# Patient Record
Sex: MALE | Race: WHITE | NOT HISPANIC OR LATINO | Employment: FULL TIME | URBAN - METROPOLITAN AREA
[De-identification: names, ages, dates, MRNs, and addresses within clinical notes are randomized per-mention and may not be internally consistent; named-entity substitution may affect disease eponyms.]

---

## 2021-04-15 ENCOUNTER — HOSPITAL ENCOUNTER (OUTPATIENT)
Dept: URGENT CARE | Facility: CLINIC | Age: 36
Discharge: HOME | End: 2021-04-15
Attending: NURSE PRACTITIONER

## 2021-05-16 ENCOUNTER — HOSPITAL ENCOUNTER (OUTPATIENT)
Dept: URGENT CARE | Facility: CLINIC | Age: 36
Discharge: HOME | End: 2021-05-16
Attending: FAMILY MEDICINE

## 2022-06-14 PROCEDURE — 87081 CULTURE SCREEN ONLY: CPT

## 2023-05-31 ENCOUNTER — TRANSCRIBE ORDERS (OUTPATIENT)
Dept: GENERAL RADIOLOGY | Facility: HOSPITAL | Age: 38
End: 2023-05-31

## 2023-09-15 ENCOUNTER — HOSPITAL ENCOUNTER (EMERGENCY)
Facility: HOSPITAL | Age: 38
Discharge: HOME | End: 2023-09-16
Attending: EMERGENCY MEDICINE
Payer: COMMERCIAL

## 2023-09-15 ENCOUNTER — APPOINTMENT (OUTPATIENT)
Dept: GENERAL RADIOLOGY | Facility: HOSPITAL | Age: 38
End: 2023-09-15
Payer: COMMERCIAL

## 2023-09-15 VITALS
HEIGHT: 72 IN | OXYGEN SATURATION: 98 % | RESPIRATION RATE: 18 BRPM | DIASTOLIC BLOOD PRESSURE: 96 MMHG | TEMPERATURE: 98 F | BODY MASS INDEX: 25.59 KG/M2 | HEART RATE: 82 BPM | SYSTOLIC BLOOD PRESSURE: 164 MMHG | WEIGHT: 188.93 LBS

## 2023-09-15 DIAGNOSIS — S62.336A CLOSED DISPLACED FRACTURE OF NECK OF FIFTH METACARPAL BONE OF RIGHT HAND, INITIAL ENCOUNTER: Primary | ICD-10-CM

## 2023-09-15 PROCEDURE — 99283 EMERGENCY DEPT VISIT LOW MDM: CPT

## 2023-09-15 PROCEDURE — 96372 THER/PROPH/DIAG INJ SC/IM: CPT

## 2023-09-15 PROCEDURE — 73130 X-RAY EXAM OF HAND: CPT

## 2023-09-16 PROCEDURE — 25010000002 KETOROLAC TROMETHAMINE PER 15 MG: Performed by: NURSE PRACTITIONER

## 2023-09-16 PROCEDURE — 96372 THER/PROPH/DIAG INJ SC/IM: CPT

## 2023-09-16 RX ORDER — KETOROLAC TROMETHAMINE 10 MG/1
10 TABLET, FILM COATED ORAL EVERY 6 HOURS PRN
Qty: 15 TABLET | Refills: 0 | Status: SHIPPED | OUTPATIENT
Start: 2023-09-16

## 2023-09-16 RX ORDER — KETOROLAC TROMETHAMINE 30 MG/ML
30 INJECTION, SOLUTION INTRAMUSCULAR; INTRAVENOUS ONCE
Status: COMPLETED | OUTPATIENT
Start: 2023-09-16 | End: 2023-09-16

## 2023-09-16 RX ORDER — OXYCODONE HYDROCHLORIDE AND ACETAMINOPHEN 5; 325 MG/1; MG/1
1 TABLET ORAL EVERY 6 HOURS PRN
Qty: 12 TABLET | Refills: 0 | Status: SHIPPED | OUTPATIENT
Start: 2023-09-16

## 2023-09-16 RX ADMIN — KETOROLAC TROMETHAMINE 30 MG: 30 INJECTION, SOLUTION INTRAMUSCULAR; INTRAVENOUS at 00:21

## 2023-09-16 NOTE — ED PROVIDER NOTES
Time: 9:39 PM EDT  Date of encounter:  9/15/2023  Independent Historian/Clinical History and Information was obtained by:   Patient    History is limited by: N/A    Chief Complaint   Patient presents with    Hand Injury         History of Present Illness:  Patient is a 38 y.o. year old male who presents to the emergency department for evaluation of right hand pain.  Patient states he got frustrated this afternoon and punched a wall and now is having right hand pain along the fifth metacarpal.  (Provider in triage, Rodger Duncan PA-C)      History provided by:  Patient    Patient Care Team  Primary Care Provider: Erica Mattson APRN    Past Medical History:     No Known Allergies  Past Medical History:   Diagnosis Date    GERD (gastroesophageal reflux disease)      No past surgical history on file.  Family History   Problem Relation Age of Onset    Esophageal cancer Father        Home Medications:  Prior to Admission medications    Medication Sig Start Date End Date Taking? Authorizing Provider   cyclobenzaprine (FLEXERIL) 5 MG tablet Take 1 tablet by mouth 3 (Three) Times a Day As Needed for Muscle Spasms. 9/14/22   Lisy Griffin APRN   diclofenac (VOLTAREN) 50 MG EC tablet Take 1 tablet by mouth 2 (Two) Times a Day As Needed (back pain). 9/14/22   Lisy Griffin APRN   pantoprazole (PROTONIX) 40 MG EC tablet pantoprazole 40 mg tablet,delayed release   TAKE 1 TABLET BY MOUTH EVERY DAY    Provider, MD Khushboo        Social History:   Social History     Tobacco Use    Smoking status: Never    Smokeless tobacco: Former     Types: Chew   Vaping Use    Vaping Use: Every day   Substance Use Topics    Alcohol use: Not Currently    Drug use: Not Currently         Review of Systems:  Review of Systems   Constitutional:  Negative for chills and fever.   HENT:  Negative for congestion, ear pain and sore throat.    Eyes:  Negative for pain.   Respiratory:  Negative for cough, chest tightness and shortness  "of breath.    Cardiovascular:  Negative for chest pain.   Gastrointestinal:  Negative for abdominal pain, diarrhea, nausea and vomiting.   Genitourinary:  Negative for flank pain and hematuria.   Musculoskeletal:  Positive for arthralgias. Negative for joint swelling.   Skin:  Negative for pallor and wound.   Neurological:  Negative for seizures and headaches.   All other systems reviewed and are negative.     Physical Exam:  /96   Pulse 82   Temp 98 °F (36.7 °C) (Oral)   Resp 18   Ht 182.9 cm (72\")   Wt 85.7 kg (188 lb 15 oz)   SpO2 98%   BMI 25.62 kg/m²         Physical Exam  Constitutional:       Appearance: Normal appearance.   HENT:      Head: Normocephalic.   Eyes:      Extraocular Movements: Extraocular movements intact.      Conjunctiva/sclera: Conjunctivae normal.   Pulmonary:      Effort: Pulmonary effort is normal.   Abdominal:      General: There is no distension.   Musculoskeletal:      Right hand: Tenderness and bony tenderness present. Decreased range of motion. There is no disruption of two-point discrimination. Normal capillary refill. Normal pulse.        Hands:    Skin:     General: Skin is warm.      Coloration: Skin is not cyanotic.   Neurological:      Mental Status: He is alert and oriented to person, place, and time.   Psychiatric:         Attention and Perception: Attention and perception normal.         Mood and Affect: Mood normal.                    Procedures:  Procedures      Medical Decision Making:      Comorbidities that affect care:    None    External Notes reviewed:    None      The following orders were placed and all results were independently analyzed by me:  Orders Placed This Encounter   Procedures    XR Hand 3+ View Right    Ambulatory Referral to Orthopedic Surgery    Obtain & Apply The Following-       Medications Given in the Emergency Department:  Medications   ketorolac (TORADOL) injection 30 mg (30 mg Intramuscular Given 9/16/23 0021)        ED " Course:    The patient was initially evaluated in the triage area where orders were placed. The patient was later dispositioned by SUKHDEEP Freeman.      The patient was advised to stay for completion of workup which includes but is not limited to communication of labs and radiological results, reassessment and plan. The patient was advised that leaving prior to disposition by a provider could result in critical findings that are not communicated to the patient.     ED Course as of 09/16/23 0618   Fri Sep 15, 2023   2141 PROVIDER IN TRIAGE  Patient was evaluated by me in triage, Rodger Duncan PA-C.  Orders were placed and patient is currently awaiting final results and disposition.  [MD]      ED Course User Index  [MD] Rodger Duncan PA-C       Labs:    Lab Results (last 24 hours)       ** No results found for the last 24 hours. **             Imaging:    XR Hand 3+ View Right    Result Date: 9/15/2023  PROCEDURE: XR HAND 3+ VW RIGHT  COMPARISON: None  INDICATIONS: RIGHT HAND PAIN IN 5TH METACARPAL AFTER PUNCHING WALL  FINDINGS:  There is a comminuted fracture involving the distal 5th metacarpal bone.  There is ventral angulation of the distal head fracture fragment.  No dislocation is seen.  Surrounding soft tissue swelling is noted.        1. Comminuted fracture of the distal 5th metacarpal bone.  There is ventral angulation of the distal head fracture fragment.  There is no dislocation.      PANCHO ANTONIO MD       Electronically Signed and Approved By: PANCHO ANTONIO MD on 9/15/2023 at 22:26                Differential Diagnosis and Discussion:      Extremity Pain: Differential diagnosis includes but is not limited to soft tissue sprain, tendonitis, tendon injury, dislocation, fracture, deep vein thrombosis, arterial insufficiency, osteoarthritis, bursitis, and ligamentous damage.    All X-rays impressions were independently interpreted by me.    MDM     Amount and/or Complexity of Data Reviewed  Tests  in the radiology section of CPT®: reviewed             Patient Care Considerations:          Consultants/Shared Management Plan:    None    Social Determinants of Health:    Patient is independent, reliable, and has access to care.       Disposition and Care Coordination:    Discharged: The patient is suitable and stable for discharge with no need for consideration of observation or admission.    I have explained the patient´s condition, diagnoses and treatment plan based on the information available to me at this time. I have answered questions and addressed any concerns. The patient has a good  understanding of the patient´s diagnosis, condition, and treatment plan as can be expected at this point. The vital signs have been stable. The patient´s condition is stable and appropriate for discharge from the emergency department.      The patient will pursue further outpatient evaluation with the primary care physician or other designated or consulting physician as outlined in the discharge instructions. They are agreeable to this plan of care and follow-up instructions have been explained in detail. The patient has received these instructions in written format and have expressed an understanding of the discharge instructions. The patient is aware that any significant change in condition or worsening of symptoms should prompt an immediate return to this or the closest emergency department or call to 911.    Final diagnoses:   Closed displaced fracture of neck of fifth metacarpal bone of right hand, initial encounter        ED Disposition       ED Disposition   Discharge    Condition   Stable    Comment   --               This medical record created using voice recognition software.             Mickey Trevino, APRN  09/16/23 0618

## 2023-09-18 ENCOUNTER — TELEPHONE (OUTPATIENT)
Dept: ORTHOPEDIC SURGERY | Facility: CLINIC | Age: 38
End: 2023-09-18
Payer: COMMERCIAL

## 2023-09-18 NOTE — TELEPHONE ENCOUNTER
Comminuted fracture of the distal 5th metacarpal bone.  There is ventral angulation of the   distal head fracture fragment.  There is no dislocation. Right hand xray at [facility] 9-15

## 2023-09-19 ENCOUNTER — OFFICE VISIT (OUTPATIENT)
Dept: ORTHOPEDIC SURGERY | Facility: CLINIC | Age: 38
End: 2023-09-19
Payer: COMMERCIAL

## 2023-09-19 VITALS — WEIGHT: 188 LBS | HEIGHT: 72 IN | BODY MASS INDEX: 25.47 KG/M2

## 2023-09-19 DIAGNOSIS — S62.306A CLOSED FRACTURE OF FIFTH METACARPAL BONE OF RIGHT HAND, UNSPECIFIED FRACTURE MORPHOLOGY, INITIAL ENCOUNTER: Primary | ICD-10-CM

## 2023-09-19 RX ORDER — TRAMADOL HYDROCHLORIDE 50 MG/1
50 TABLET ORAL EVERY 6 HOURS PRN
Qty: 30 TABLET | Refills: 0 | Status: SHIPPED | OUTPATIENT
Start: 2023-09-19

## 2023-09-19 NOTE — PROGRESS NOTES
"Chief Complaint  Initial Evaluation of the Right Hand     Subjective      Celio Fairchild presents to [facility] for initial evaluation of the right hand.  Friday night he punched a wall and hit a stud.  He went to the ER and had X rays and placed on a splint and a sling.  He is here today for treatment intervention.     No Known Allergies     Social History     Socioeconomic History    Marital status: Single   Tobacco Use    Smoking status: Never    Smokeless tobacco: Former     Types: Chew   Vaping Use    Vaping Use: Every day   Substance and Sexual Activity    Alcohol use: Not Currently    Drug use: Not Currently    Sexual activity: Never        I reviewed the patient's chief complaint, history of present illness, review of systems, past medical history, surgical history, family history, social history, medications, and allergy list.     Review of Systems     Constitutional: Denies fevers, chills, weight loss  Cardiovascular: Denies chest pain, shortness of breath  Skin: Denies rashes, acute skin changes  Neurologic: Denies headache, loss of consciousness        Vital Signs:   Ht 182.9 cm (72\")   Wt 85.3 kg (188 lb)   BMI 25.50 kg/m²          Physical Exam  General: Alert. No acute distress    Ortho Exam        RIGHT HAND Mildly Full ROM of the hand, fingers, and elbow. Sensation grossly intact to light touch, median, radial and ulnar nerve. Positive AIN, PIN and ulnar nerve motor function intact. Axillary nerve intact. Positive pulses.       Orthopedic Injury Treatment    Date/Time: 9/19/2023 10:35 AM  Performed by: Maximilian Garcia MD  Authorized by: Maximilian Garcia MD   Injury location: hand  Location details: right hand  Injury type: fracture    Anesthesia:  Local anesthesia used: no    Sedation:  Patient sedated: no    Immobilization: splint  Splint type: ulnar gutter  Post-procedure neurovascular assessment: post-procedure neurovascularly intact  Patient tolerance: patient " tolerated the procedure well with no immediate complications        Imaging Results (Most Recent)       None             Result Review :         XR Hand 3+ View Right    Result Date: 9/15/2023  Narrative: PROCEDURE: XR HAND 3+ VW RIGHT  COMPARISON: None  INDICATIONS: RIGHT HAND PAIN IN 5TH METACARPAL AFTER PUNCHING WALL  FINDINGS:  There is a comminuted fracture involving the distal 5th metacarpal bone.  There is ventral angulation of the distal head fracture fragment.  No dislocation is seen.  Surrounding soft tissue swelling is noted.      Impression:   1. Comminuted fracture of the distal 5th metacarpal bone.  There is ventral angulation of the distal head fracture fragment.  There is no dislocation.      PANCHO ANTONIO MD       Electronically Signed and Approved By: PANCHO ANTONIO MD on 9/15/2023 at 22:26                     Assessment and Plan     Diagnoses and all orders for this visit:    1. Communited fracture of the 5 th metacarpal of the right hand (Primary)        Discussed the treatment plan with the patient. I reviewed the X-rays that were obtained 9/15/23 with the patient.     Discussed the treatment options with the patient, operative vs non-operative.     Refer to Dr Samaniego's group.     New splint donned.      Call or return if worsening symptoms.    Follow Up     PRN      Patient was given instructions and counseling regarding his condition or for health maintenance advice. Please see specific information pulled into the AVS if appropriate.     Scribed for Maximilian Garcia MD by Doris Perez MA.  09/19/23   09:35 EDT    I have personally performed the services described in this document as scribed by the above individual and it is both accurate and complete. Maximilian Garcia MD 09/20/23

## 2023-10-18 ENCOUNTER — TELEPHONE (OUTPATIENT)
Dept: GASTROENTEROLOGY | Facility: CLINIC | Age: 38
End: 2023-10-18
Payer: COMMERCIAL

## 2023-10-18 NOTE — TELEPHONE ENCOUNTER
PATIENT CONFIRMED HIS APPOINTMENT ON 10/20/23 AT [facility] ON 10/20/23. HE RESCHEDULE HIS APPOINTMENT FROM 2:00 TO 9:45.

## 2023-10-20 ENCOUNTER — TELEPHONE (OUTPATIENT)
Dept: GASTROENTEROLOGY | Facility: HOSPITAL | Age: 38
End: 2023-10-20
Payer: COMMERCIAL

## 2023-10-20 ENCOUNTER — OFFICE VISIT (OUTPATIENT)
Dept: GASTROENTEROLOGY | Facility: HOSPITAL | Age: 38
End: 2023-10-20
Payer: COMMERCIAL

## 2023-10-20 VITALS
DIASTOLIC BLOOD PRESSURE: 69 MMHG | HEART RATE: 80 BPM | BODY MASS INDEX: 25.53 KG/M2 | SYSTOLIC BLOOD PRESSURE: 124 MMHG | WEIGHT: 188.5 LBS | HEIGHT: 72 IN

## 2023-10-20 DIAGNOSIS — B18.2 CHRONIC HEPATITIS C WITHOUT HEPATIC COMA: Primary | ICD-10-CM

## 2023-10-20 PROBLEM — B19.20 VIRAL HEPATITIS C: Status: ACTIVE | Noted: 2023-05-31

## 2023-10-20 PROBLEM — K21.9 GASTROESOPHAGEAL REFLUX DISEASE WITHOUT ESOPHAGITIS: Status: ACTIVE | Noted: 2023-05-31

## 2023-10-20 LAB
ALBUMIN SERPL-MCNC: 5.2 G/DL (ref 3.5–5.2)
ALBUMIN/GLOB SERPL: 1.3 G/DL
ALP SERPL-CCNC: 83 U/L (ref 39–117)
ALT SERPL W P-5'-P-CCNC: 75 U/L (ref 1–41)
ANION GAP SERPL CALCULATED.3IONS-SCNC: 13 MMOL/L (ref 5–15)
AST SERPL-CCNC: 48 U/L (ref 1–40)
BASOPHILS # BLD AUTO: 0.03 10*3/MM3 (ref 0–0.2)
BASOPHILS NFR BLD AUTO: 0.4 % (ref 0–1.5)
BILIRUB SERPL-MCNC: 1 MG/DL (ref 0–1.2)
BUN SERPL-MCNC: 14 MG/DL (ref 6–20)
BUN/CREAT SERPL: 11.6 (ref 7–25)
CALCIUM SPEC-SCNC: 10.1 MG/DL (ref 8.6–10.5)
CHLORIDE SERPL-SCNC: 101 MMOL/L (ref 98–107)
CO2 SERPL-SCNC: 27 MMOL/L (ref 22–29)
CREAT SERPL-MCNC: 1.21 MG/DL (ref 0.76–1.27)
DEPRECATED RDW RBC AUTO: 40.3 FL (ref 37–54)
EGFRCR SERPLBLD CKD-EPI 2021: 79.6 ML/MIN/1.73
EOSINOPHIL # BLD AUTO: 0.06 10*3/MM3 (ref 0–0.4)
EOSINOPHIL NFR BLD AUTO: 0.9 % (ref 0.3–6.2)
ERYTHROCYTE [DISTWIDTH] IN BLOOD BY AUTOMATED COUNT: 12.4 % (ref 12.3–15.4)
GLOBULIN UR ELPH-MCNC: 4.1 GM/DL
GLUCOSE SERPL-MCNC: 95 MG/DL (ref 65–99)
HAV IGM SERPL QL IA: ABNORMAL
HBV CORE IGM SERPL QL IA: ABNORMAL
HBV SURFACE AB SER RIA-ACNC: REACTIVE
HBV SURFACE AG SERPL QL IA: ABNORMAL
HCT VFR BLD AUTO: 47.8 % (ref 37.5–51)
HCV AB SER DONR QL: REACTIVE
HGB BLD-MCNC: 16.9 G/DL (ref 13–17.7)
HIV1+2 AB SER QL: NORMAL
IMM GRANULOCYTES # BLD AUTO: 0.02 10*3/MM3 (ref 0–0.05)
IMM GRANULOCYTES NFR BLD AUTO: 0.3 % (ref 0–0.5)
LYMPHOCYTES # BLD AUTO: 1.74 10*3/MM3 (ref 0.7–3.1)
LYMPHOCYTES NFR BLD AUTO: 25.6 % (ref 19.6–45.3)
MCH RBC QN AUTO: 32 PG (ref 26.6–33)
MCHC RBC AUTO-ENTMCNC: 35.4 G/DL (ref 31.5–35.7)
MCV RBC AUTO: 90.5 FL (ref 79–97)
MONOCYTES # BLD AUTO: 0.83 10*3/MM3 (ref 0.1–0.9)
MONOCYTES NFR BLD AUTO: 12.2 % (ref 5–12)
NEUTROPHILS NFR BLD AUTO: 4.11 10*3/MM3 (ref 1.7–7)
NEUTROPHILS NFR BLD AUTO: 60.6 % (ref 42.7–76)
NRBC BLD AUTO-RTO: 0 /100 WBC (ref 0–0.2)
PLATELET # BLD AUTO: 350 10*3/MM3 (ref 140–450)
PMV BLD AUTO: 11.5 FL (ref 6–12)
POTASSIUM SERPL-SCNC: 4.4 MMOL/L (ref 3.5–5.2)
PROT SERPL-MCNC: 9.3 G/DL (ref 6–8.5)
RBC # BLD AUTO: 5.28 10*6/MM3 (ref 4.14–5.8)
SODIUM SERPL-SCNC: 141 MMOL/L (ref 136–145)
WBC NRBC COR # BLD: 6.79 10*3/MM3 (ref 3.4–10.8)

## 2023-10-20 PROCEDURE — 87522 HEPATITIS C REVRS TRNSCRPJ: CPT | Performed by: NURSE PRACTITIONER

## 2023-10-20 PROCEDURE — 87902 NFCT AGT GNTYP ALYS HEP C: CPT | Performed by: NURSE PRACTITIONER

## 2023-10-20 PROCEDURE — 80074 ACUTE HEPATITIS PANEL: CPT | Performed by: NURSE PRACTITIONER

## 2023-10-20 PROCEDURE — G0432 EIA HIV-1/HIV-2 SCREEN: HCPCS | Performed by: NURSE PRACTITIONER

## 2023-10-20 PROCEDURE — G0463 HOSPITAL OUTPT CLINIC VISIT: HCPCS | Performed by: NURSE PRACTITIONER

## 2023-10-20 PROCEDURE — 86706 HEP B SURFACE ANTIBODY: CPT | Performed by: NURSE PRACTITIONER

## 2023-10-20 PROCEDURE — 80053 COMPREHEN METABOLIC PANEL: CPT | Performed by: NURSE PRACTITIONER

## 2023-10-20 PROCEDURE — 91200 LIVER ELASTOGRAPHY: CPT | Performed by: NURSE PRACTITIONER

## 2023-10-20 PROCEDURE — 85025 COMPLETE CBC W/AUTO DIFF WBC: CPT | Performed by: NURSE PRACTITIONER

## 2023-10-20 PROCEDURE — 86704 HEP B CORE ANTIBODY TOTAL: CPT | Performed by: NURSE PRACTITIONER

## 2023-10-20 NOTE — PROGRESS NOTES
Chief Complaint      Chief Complaint  Hepatitis C    Subjective            History of Present Illness     Celio Fairchild presents to [facility] for evaluation and treatment of chronic HCV.      He reports being diagnosed with hepatitis C in 2019.  Denies previous treatment.  Admits a history of illicit drug use.  Reports being clean for the past 6 years.  Reports drinking 2 beers each night.      Reports recent RUQ US at [facility].       Past Medical History     No Known Allergies    Current Outpatient Medications:     pantoprazole (PROTONIX) 40 MG EC tablet, pantoprazole 40 mg tablet,delayed release  TAKE 1 TABLET BY MOUTH EVERY DAY, Disp: , Rfl:   Past Medical History:   Diagnosis Date    GERD (gastroesophageal reflux disease)      History reviewed. No pertinent surgical history.  Social History     Socioeconomic History    Marital status: Single   Tobacco Use    Smoking status: Never    Smokeless tobacco: Former     Types: Chew   Vaping Use    Vaping Use: Every day   Substance and Sexual Activity    Alcohol use: Yes    Drug use: Not Currently    Sexual activity: Never       Objective     Objective     Vitals:    10/20/23 0949   BP: 124/69   Pulse: 80     Body mass index is 25.56 kg/m².  Body surface area is 2.08 meters squared.    Physical Exam    Results       Result Review :     The following data was reviewed by: SUKHDEEP Knox on 10/20/2023:    Donna Stiffness Consistent with: F0-F1   CAP Score: S0    CMP:  Lab Results   Component Value Date     (H) 01/31/2019    CREATININE 6.74 (H) 01/31/2019     01/31/2019    K 3.8 01/31/2019    CL 69 (L) 01/31/2019    CALCIUM 8.4 (L) 01/31/2019    ALBUMIN 4.7 01/31/2019    BILITOT 1.36 (H) 01/31/2019    ALKPHOS 89 01/31/2019    AST 76 (H) 01/31/2019     (H) 01/31/2019     CBC w/ diff:   Lab Results   Component Value Date    WBC 28.40 (H) 01/31/2019    RBC 5.81 01/31/2019    HGB 18.20 (H) 01/31/2019     HCT 51.7 01/31/2019    MCV 88.9 01/31/2019    MCH 31.4 (H) 01/31/2019    MCHC 35.3 01/31/2019    RDW 12.0 01/31/2019    .00 (H) 01/31/2019    NEUTRORELPCT 80.0 01/31/2019    MONORELPCT 14.30 (H) 01/31/2019    EOSRELPCT 0.04 01/31/2019    BASORELPCT 0.06 01/31/2019       Acute Hepatitis Panel   Lab Results   Component Value Date    HEPAIGM Negative 01/31/2019    HEPBIGMCORE Negative 01/31/2019 1/31/2019 hepatitis C antibody-reactive.    6/6/2023 right upper quadrant ultrasound-liver measures 17 cm.  Normal gallbladder.  No liver masses are seen.      Liver Elastography    Performed by: Cookie Peterson APRN  Authorized by: Cookie Peterson APRN  Ordering Provider: Cookie Peterson APRN    Probe:  M+  Procedure Details:  Procedure: After providing an oral and written explanation of the Fibroscan vibration controlled transient elastography (VTCE) test procedure to the patient. The patient was placed in supine position with right arm in maximum abduction to allow optimal exposure of right lateral abdomen. Patient was briefly assessed, identifying terminus of the xyphoid process and locating an ideal transient elastography testing site, midline and lateral to this point. Patient was instructed to breathe normally and remain stationary during the test process. Pre-measurement data confirmed the transient elastography probe was centered over the liver parenchyma. A series of ten 50 Hz mechanical pulses were applied with controlled application pressure to induce a mechanical shear wave in the liver tissue. For each measurement, the shear wave propagation speed was detected, displayed and converted to its equivalent liver stiffness value in kilopascals. Skin to liver capsules distance and shear wave characteristics were monitored during the entire examination to assure quality data. Median liver stiffness measurement and interquartile range were calculated and displayed in real time. Acquired  measurement data was stored and submitted to the provider for review and interpretation. Patient tolerated the procedure well and was discharged without incident.   Clinical Information:     NPO 3 Hours or More: Yes      Actively Drinking: No    Findings:     Median Liver Stiffness Score:  2.6    Interquartile Range (IQR) to Median Ratio:  11    Interpretation:  Taking into account the patient's history and recent liver test, this Liver Stiffness Score is consistent with below scale:    Donna Stiffness Consistent with:  F0-F1    Current Scan Considered Reliab: Yes      Median Controlled Attenuation Parameter (dB/m):  216    IQR:  14    Liver Fat Interpretation:  Taking into account the patient's history, this CAP score is consistent with below scale:      CAP SCORE:  Normal/mild liver fat       Assessment and Plan            Assessment:    Diagnoses and all orders for this visit:    1. Chronic hepatitis C without hepatic coma (Primary)  -     Liver Elastography  -     Hepatitis B Surface Antibody  -     Hepatitis B Core Antibody, Total  -     HIV-1 & HIV-2 Antibodies  -     Hepatitis C Genotype  -     Hepatitis C RNA, Quantitative, PCR (graph)  -     Comprehensive Metabolic Panel  -     CBC Auto Differential  -     Cancel: US Abdomen Limited; Future  -     Hepatitis Panel, Acute  -     HCV FibroSURE         Plan:   Labs today to determine genotype and treatment plan.      Patient Instructions: Avoid Alcohol, Avoid Illicit Drug Use, Importance of keeping appointments.  Patient Education Provided: Yes    Follow Up     Follow Up   Return in about 4 weeks (around 11/17/2023) for Hepatitis C.  Patient was given instructions and counseling regarding his condition or for health maintenance advice. Please see specific information pulled into the AVS if appropriate.     Cookie Peterson, APRN  10/20/2023

## 2023-10-20 NOTE — TELEPHONE ENCOUNTER
Requested labs from Erica Mattson office.   Labs being faxed from 5/23.   Ultrasound requested from pcp.

## 2023-10-21 LAB — HBV CORE AB SERPL QL IA: NEGATIVE

## 2023-10-22 LAB
HCV GENTYP SERPL NAA+PROBE: NORMAL
LABORATORY COMMENT REPORT: NORMAL

## 2023-10-23 LAB
HCV RNA SERPL NAA+PROBE-ACNC: NORMAL IU/ML
HCV RNA SERPL NAA+PROBE-LOG IU: 4.8 LOG10 IU/ML
TEST INFORMATION: NORMAL

## 2023-10-24 ENCOUNTER — TELEPHONE (OUTPATIENT)
Dept: GASTROENTEROLOGY | Facility: CLINIC | Age: 38
End: 2023-10-24
Payer: COMMERCIAL

## 2023-10-24 LAB
A2 MACROGLOB SERPL-MCNC: 242 MG/DL (ref 110–276)
ALT SERPL W P-5'-P-CCNC: 79 IU/L (ref 0–55)
APO A-I SERPL-MCNC: 186 MG/DL (ref 101–178)
BILIRUB SERPL-MCNC: 0.9 MG/DL (ref 0–1.2)
FIBROSIS SCORING:: ABNORMAL
FIBROSIS STAGE SERPL QL: ABNORMAL
GGT SERPL-CCNC: 79 IU/L (ref 0–65)
HAPTOGLOB SERPL-MCNC: 95 MG/DL (ref 17–317)
HCV AB SER QL: ABNORMAL
LABORATORY COMMENT REPORT: ABNORMAL
LIVER FIBR SCORE SERPL CALC.FIBROSURE: 0.34 (ref 0–0.21)
NECROINFLAMM ACTIVITY SCORING:: ABNORMAL
NECROINFLAMMATORY ACT GRADE SERPL QL: ABNORMAL
NECROINFLAMMATORY ACT SCORE SERPL: 0.48 (ref 0–0.17)
SERVICE CMNT-IMP: ABNORMAL
TEST PERFORMANCE INFO SPEC: ABNORMAL

## 2023-10-24 NOTE — TELEPHONE ENCOUNTER
----- Message from SUKHDEEP Knox sent at 10/23/2023  1:40 PM EDT -----  Labs c/w HCV, GT 1a, please obtain approval for epclusa and schedule for training visit.

## 2023-11-02 ENCOUNTER — TELEPHONE (OUTPATIENT)
Dept: GASTROENTEROLOGY | Facility: CLINIC | Age: 38
End: 2023-11-02
Payer: COMMERCIAL

## 2023-11-02 NOTE — TELEPHONE ENCOUNTER
Patient said his insurance has switched from [employer] to [employer] starting yesterday. I have updated his insurance in his chart. He has a nurse visit with you on 11/17/23.

## 2023-11-15 ENCOUNTER — TELEPHONE (OUTPATIENT)
Dept: GASTROENTEROLOGY | Facility: HOSPITAL | Age: 38
End: 2023-11-15
Payer: COMMERCIAL

## 2023-11-15 NOTE — TELEPHONE ENCOUNTER
Attempted to contact the patient to confirm the appointment on 11/17/23 at the [facility]. Patient did not answer and the voicemail box was full so I was unable to leave a voicemail requesting a call back.

## 2023-11-17 ENCOUNTER — CLINICAL SUPPORT (OUTPATIENT)
Dept: GASTROENTEROLOGY | Facility: HOSPITAL | Age: 38
End: 2023-11-17
Payer: COMMERCIAL

## 2023-11-17 DIAGNOSIS — B18.2 CHRONIC HEPATITIS C WITH HEPATIC COMA: Primary | ICD-10-CM

## 2023-11-17 NOTE — PROGRESS NOTES
Celio Fairchild 1985 completed a medication education visit on 11/17/2023 for Epclusa prescribed by SUKHDEEP Golden. Celio Fairchild verbalized understanding for all of the information outlined in the signed agreement. The patient has been provided with a copy of the agreement which has also been scanned into their chart as well.    
Ambulatory

## 2023-11-22 ENCOUNTER — TELEPHONE (OUTPATIENT)
Dept: GASTROENTEROLOGY | Facility: CLINIC | Age: 38
End: 2023-11-22
Payer: COMMERCIAL

## 2023-11-22 NOTE — TELEPHONE ENCOUNTER
"[facility] called the office and left a voicemail requesting a call back at [phone]. The pharmacist stated \" We need to make sure that Cookie Peterson is aware that the patient is taking omeprazole, it has a major drug interaction with Epclusa. If the provider is okay with the patient taking both medications then we will dispense the Epclusa and advise on how to take it with omeprazole. Please give us a call back so we know how to proceed with this patients prescription.\"  "

## 2023-11-27 NOTE — TELEPHONE ENCOUNTER
Please let them know I’m aware and advise on instructions of co-administration with omeprazole as provided in patient teaching.

## 2023-11-27 NOTE — TELEPHONE ENCOUNTER
Pharmacy aware of omeprazole instructions stated they spoke to patient and also told him 20 mg omeprazole 4 hours after epclusa was taken. Medication released for delivery.

## 2023-12-28 ENCOUNTER — TELEPHONE (OUTPATIENT)
Dept: OTHER | Facility: HOSPITAL | Age: 38
End: 2023-12-28
Payer: COMMERCIAL

## 2023-12-29 ENCOUNTER — OFFICE VISIT (OUTPATIENT)
Dept: GASTROENTEROLOGY | Facility: HOSPITAL | Age: 38
End: 2023-12-29
Payer: COMMERCIAL

## 2023-12-29 VITALS
HEIGHT: 72 IN | HEART RATE: 81 BPM | DIASTOLIC BLOOD PRESSURE: 51 MMHG | WEIGHT: 189 LBS | SYSTOLIC BLOOD PRESSURE: 143 MMHG | BODY MASS INDEX: 25.6 KG/M2

## 2023-12-29 DIAGNOSIS — B18.2 CHRONIC HEPATITIS C WITHOUT HEPATIC COMA: Primary | ICD-10-CM

## 2023-12-29 LAB
ALBUMIN SERPL-MCNC: 4.8 G/DL (ref 3.5–5.2)
ALBUMIN/GLOB SERPL: 1.7 G/DL
ALP SERPL-CCNC: 69 U/L (ref 39–117)
ALT SERPL W P-5'-P-CCNC: 24 U/L (ref 1–41)
ANION GAP SERPL CALCULATED.3IONS-SCNC: 11 MMOL/L (ref 5–15)
AST SERPL-CCNC: 25 U/L (ref 1–40)
BASOPHILS # BLD AUTO: 0.02 10*3/MM3 (ref 0–0.2)
BASOPHILS NFR BLD AUTO: 0.2 % (ref 0–1.5)
BILIRUB SERPL-MCNC: 0.8 MG/DL (ref 0–1.2)
BUN SERPL-MCNC: 21 MG/DL (ref 6–20)
BUN/CREAT SERPL: 19.3 (ref 7–25)
CALCIUM SPEC-SCNC: 9.4 MG/DL (ref 8.6–10.5)
CHLORIDE SERPL-SCNC: 105 MMOL/L (ref 98–107)
CO2 SERPL-SCNC: 24 MMOL/L (ref 22–29)
CREAT SERPL-MCNC: 1.09 MG/DL (ref 0.76–1.27)
DEPRECATED RDW RBC AUTO: 42.2 FL (ref 37–54)
EGFRCR SERPLBLD CKD-EPI 2021: 90.2 ML/MIN/1.73
EOSINOPHIL # BLD AUTO: 0.07 10*3/MM3 (ref 0–0.4)
EOSINOPHIL NFR BLD AUTO: 0.7 % (ref 0.3–6.2)
ERYTHROCYTE [DISTWIDTH] IN BLOOD BY AUTOMATED COUNT: 12.6 % (ref 12.3–15.4)
GLOBULIN UR ELPH-MCNC: 2.9 GM/DL
GLUCOSE SERPL-MCNC: 91 MG/DL (ref 65–99)
HCT VFR BLD AUTO: 43.3 % (ref 37.5–51)
HGB BLD-MCNC: 15.3 G/DL (ref 13–17.7)
IMM GRANULOCYTES # BLD AUTO: 0.03 10*3/MM3 (ref 0–0.05)
IMM GRANULOCYTES NFR BLD AUTO: 0.3 % (ref 0–0.5)
LYMPHOCYTES # BLD AUTO: 2.76 10*3/MM3 (ref 0.7–3.1)
LYMPHOCYTES NFR BLD AUTO: 26 % (ref 19.6–45.3)
MCH RBC QN AUTO: 32.7 PG (ref 26.6–33)
MCHC RBC AUTO-ENTMCNC: 35.3 G/DL (ref 31.5–35.7)
MCV RBC AUTO: 92.5 FL (ref 79–97)
MONOCYTES # BLD AUTO: 1.05 10*3/MM3 (ref 0.1–0.9)
MONOCYTES NFR BLD AUTO: 9.9 % (ref 5–12)
NEUTROPHILS NFR BLD AUTO: 6.7 10*3/MM3 (ref 1.7–7)
NEUTROPHILS NFR BLD AUTO: 62.9 % (ref 42.7–76)
NRBC BLD AUTO-RTO: 0 /100 WBC (ref 0–0.2)
PLATELET # BLD AUTO: 338 10*3/MM3 (ref 140–450)
PMV BLD AUTO: 11.3 FL (ref 6–12)
POTASSIUM SERPL-SCNC: 4.1 MMOL/L (ref 3.5–5.2)
PROT SERPL-MCNC: 7.7 G/DL (ref 6–8.5)
RBC # BLD AUTO: 4.68 10*6/MM3 (ref 4.14–5.8)
SODIUM SERPL-SCNC: 140 MMOL/L (ref 136–145)
WBC NRBC COR # BLD AUTO: 10.63 10*3/MM3 (ref 3.4–10.8)

## 2023-12-29 PROCEDURE — 85025 COMPLETE CBC W/AUTO DIFF WBC: CPT | Performed by: NURSE PRACTITIONER

## 2023-12-29 PROCEDURE — 80053 COMPREHEN METABOLIC PANEL: CPT | Performed by: NURSE PRACTITIONER

## 2023-12-29 PROCEDURE — 87522 HEPATITIS C REVRS TRNSCRPJ: CPT | Performed by: NURSE PRACTITIONER

## 2023-12-29 PROCEDURE — G0463 HOSPITAL OUTPT CLINIC VISIT: HCPCS | Performed by: NURSE PRACTITIONER

## 2023-12-29 NOTE — PROGRESS NOTES
Chief Complaint        Hepatitis C    HPI     Mr. Fairchild is a 37 y/o male with chronic HCV, GT 1a with F0/F1 fibrosis determined by liver elastography.  He began treatment with Epclusa and reports compliance with treatment regimen.  Denies side effects.      Subjective            Past Medical History     No Known Allergies    Current Outpatient Medications:     Epclusa 400-100 MG tablet, Take 1 tablet by mouth Daily., Disp: 84 tablet, Rfl: 0    omeprazole (priLOSEC) 20 MG capsule, Take 1 capsule by mouth Daily for 90 days., Disp: 90 capsule, Rfl: 1  Past Medical History:   Diagnosis Date    GERD (gastroesophageal reflux disease)      History reviewed. No pertinent surgical history.  Social History     Socioeconomic History    Marital status: Single   Tobacco Use    Smoking status: Never    Smokeless tobacco: Former     Types: Chew   Vaping Use    Vaping Use: Every day   Substance and Sexual Activity    Alcohol use: Yes    Drug use: Not Currently    Sexual activity: Never       Objective     Objective     Vital Signs     Vitals:    12/29/23 1323   BP: 143/51   Pulse: 81     Body mass index is 25.63 kg/m².  Body surface area is 2.08 meters squared.      Physical Exam    Results       Result Review :     The following data was reviewed by: SUKHDEEP Knox on 12/29/2023:      CMP:  Lab Results   Component Value Date    BUN 14 10/20/2023    CREATININE 1.21 10/20/2023     10/20/2023    K 4.4 10/20/2023     10/20/2023    CALCIUM 10.1 10/20/2023    ALBUMIN 5.2 10/20/2023    BILITOT 1.0 10/20/2023    ALKPHOS 83 10/20/2023    AST 48 (H) 10/20/2023    ALT 75 (H) 10/20/2023     CBC w/ diff:   Lab Results   Component Value Date    WBC 6.79 10/20/2023    RBC 5.28 10/20/2023    HGB 16.9 10/20/2023    HCT 47.8 10/20/2023    MCV 90.5 10/20/2023    MCH 32.0 10/20/2023    MCHC 35.4 10/20/2023    RDW 12.4 10/20/2023     10/20/2023    NEUTRORELPCT 60.6 10/20/2023    AUTOIGPER 0.3 10/20/2023    LYMPHORELPCT  25.6 10/20/2023    MONORELPCT 12.2 (H) 10/20/2023    EOSRELPCT 0.9 10/20/2023    BASORELPCT 0.4 10/20/2023     HCV Genotype, HIV   Lab Results   Component Value Date    HCVGENOTYPE 1a 10/20/2023    [id number] Non-Reactive 10/20/2023     Acute Hepatitis Panel   Lab Results   Component Value Date    HEPBSAG Non-Reactive 10/20/2023    HEPAIGM Non-Reactive 10/20/2023    HEPBIGMCORE Non-Reactive 10/20/2023    HEPCVIRUSABY Reactive (A) 10/20/2023      Lab Results   Component Value Date    HEPATITISC 82321 10/20/2023    YASFHP02 4.799 10/20/2023                Assessment and Plan          Assessment & Plan:    Diagnoses and all orders for this visit:    1. Chronic hepatitis C without hepatic coma (Primary)  -     CBC & Differential  -     Hepatitis C RNA, Quantitative, PCR (graph)  -     Comprehensive Metabolic Panel        Labs today to ensure SVR.  Continue epclusa for a total of 12 weeks.    Patient Instructions: Avoid Alcohol, Avoid Illicit Drug Use, Importance of keeping appointments.  Patient Education Provided: Yes      Follow Up     Follow Up   Return in about 6 weeks (around 2/9/2024) for Hepatitis C.  Patient was given instructions and counseling regarding his condition or for health maintenance advice. Please see specific information pulled into the AVS if appropriate.     Cookie Peterson, APRN  12/29/2023

## 2024-01-02 LAB
HCV RNA SERPL NAA+PROBE-ACNC: NORMAL IU/ML
TEST INFORMATION: NORMAL

## 2024-02-05 ENCOUNTER — TELEPHONE (OUTPATIENT)
Dept: GASTROENTEROLOGY | Facility: HOSPITAL | Age: 39
End: 2024-02-05
Payer: COMMERCIAL

## 2024-02-05 NOTE — TELEPHONE ENCOUNTER
Patient missed his 12 week follow up at [facility], patient is scheduled for a 3 month follow up and a 6 month follow up. I didn't know if patient needed another follow up appointment or if the just need to keep the two appointments scheduled.  Please advise.

## 2024-02-06 NOTE — TELEPHONE ENCOUNTER
Pt called and had questions about his medication. Pt aware that once he completes 12 weeks of treatment then he will not need any refills.

## 2024-02-06 NOTE — TELEPHONE ENCOUNTER
Called patient and left detailed voicemail that the appointment missed on 02/09/2024 does not need to be rescheduled but to please keep appointments on 05/03/2024 and 07/26/2024 so that treatment can be monitored.

## 2024-05-02 ENCOUNTER — TELEPHONE (OUTPATIENT)
Dept: GASTROENTEROLOGY | Facility: HOSPITAL | Age: 39
End: 2024-05-02
Payer: COMMERCIAL

## 2024-05-03 ENCOUNTER — OFFICE VISIT (OUTPATIENT)
Dept: GASTROENTEROLOGY | Facility: HOSPITAL | Age: 39
End: 2024-05-03
Payer: COMMERCIAL

## 2024-05-03 VITALS
HEART RATE: 83 BPM | DIASTOLIC BLOOD PRESSURE: 78 MMHG | BODY MASS INDEX: 25.9 KG/M2 | WEIGHT: 191.2 LBS | SYSTOLIC BLOOD PRESSURE: 124 MMHG | HEIGHT: 72 IN

## 2024-05-03 DIAGNOSIS — B18.2 CHRONIC HEPATITIS C WITHOUT HEPATIC COMA: Primary | ICD-10-CM

## 2024-05-03 PROCEDURE — G0463 HOSPITAL OUTPT CLINIC VISIT: HCPCS | Performed by: NURSE PRACTITIONER

## 2024-05-03 NOTE — PROGRESS NOTES
Chief Complaint        Hepatitis C (3m f/u )    HPI     Mr. Fairchild is a 37 y/o male with chronic HCV, GT 1a with F0/F1 fibrosis determined by liver elastography.  He completed treatment with 12 weeks of Epclusa.  Reports compliance with treatment regimen.      Subjective            Past Medical History     No Known Allergies    Current Outpatient Medications:     albuterol sulfate  (90 Base) MCG/ACT inhaler, Inhale 2 puffs Every 4 (Four) Hours As Needed for Wheezing., Disp: 8 g, Rfl: 0    azelastine (ASTELIN) 0.1 % nasal spray, 2 sprays into the nostril(s) as directed by provider 2 (Two) Times a Day. Use in each nostril as directed, Disp: 30 mL, Rfl: 0    brompheniramine-pseudoephedrine-DM 30-2-10 MG/5ML syrup, Take 10 mL by mouth 4 (Four) Times a Day As Needed for Congestion, Cough or Allergies., Disp: 180 mL, Rfl: 0    fluticasone (FLONASE) 50 MCG/ACT nasal spray, 2 sprays into the nostril(s) as directed by provider Daily., Disp: 18.2 mL, Rfl: 0  Past Medical History:   Diagnosis Date    GERD (gastroesophageal reflux disease)      History reviewed. No pertinent surgical history.  Social History     Socioeconomic History    Marital status: Single   Tobacco Use    Smoking status: Never    Smokeless tobacco: Current     Types: Chew     Last attempt to quit: 2022   Vaping Use    Vaping status: Some Days    Substances: Nicotine    Devices: Disposable   Substance and Sexual Activity    Alcohol use: Yes    Drug use: Not Currently    Sexual activity: Never       Objective     Objective     Vital Signs     Vitals:    05/03/24 1333   BP: 124/78   Pulse: 83     Body mass index is 25.93 kg/m².  Body surface area is 2.09 meters squared.      Physical Exam    Results       Result Review :     The following data was reviewed by: SUKHDEEP Knox on 05/03/2024:      CMP:  Lab Results   Component Value Date    BUN 21 (H) 12/29/2023    CREATININE 1.09 12/29/2023     12/29/2023    K 4.1 12/29/2023      12/29/2023    CALCIUM 9.4 12/29/2023    ALBUMIN 4.8 12/29/2023    BILITOT 0.8 12/29/2023    ALKPHOS 69 12/29/2023    AST 25 12/29/2023    ALT 24 12/29/2023     CBC w/ diff:   Lab Results   Component Value Date    WBC 10.63 12/29/2023    RBC 4.68 12/29/2023    HGB 15.3 12/29/2023    HCT 43.3 12/29/2023    MCV 92.5 12/29/2023    MCH 32.7 12/29/2023    MCHC 35.3 12/29/2023    RDW 12.6 12/29/2023     12/29/2023    NEUTRORELPCT 62.9 12/29/2023    AUTOIGPER 0.3 12/29/2023    LYMPHORELPCT 26.0 12/29/2023    MONORELPCT 9.9 12/29/2023    EOSRELPCT 0.7 12/29/2023    BASORELPCT 0.2 12/29/2023     HCV Genotype, HIV   Lab Results   Component Value Date    HCVGENOTYPE 1a 10/20/2023    [id number] Non-Reactive 10/20/2023     Lab Results   Component Value Date    HEPATITISC HCV Not Detected 12/29/2023    [id number] 4.799 10/20/2023                Assessment and Plan          Assessment & Plan:    Diagnoses and all orders for this visit:    1. Chronic hepatitis C without hepatic coma (Primary)  -     Hepatitis C RNA, Quantitative, PCR (graph)  -     CBC & Differential  -     Comprehensive Metabolic Panel        Labs today to ensure SVR.    Patient Instructions: Avoid Alcohol, Avoid Illicit Drug Use, Importance of keeping appointments.  Patient Education Provided: Yes      Follow Up     Follow Up   Return in about 3 months (around 8/3/2024) for Hepatitis C.  Patient was given instructions and counseling regarding his condition or for health maintenance advice. Please see specific information pulled into the AVS if appropriate.     Cookie Peterson, APRN  05/03/2024

## 2024-05-07 PROBLEM — R05.1 ACUTE COUGH: Status: ACTIVE | Noted: 2024-05-07

## 2024-05-07 PROBLEM — J30.2 SEASONAL ALLERGIC RHINITIS: Status: ACTIVE | Noted: 2024-05-07

## 2024-05-13 ENCOUNTER — LAB (OUTPATIENT)
Dept: LAB | Facility: HOSPITAL | Age: 39
End: 2024-05-13
Payer: COMMERCIAL

## 2024-05-13 DIAGNOSIS — B18.2 CHRONIC HEPATITIS C WITHOUT HEPATIC COMA: ICD-10-CM

## 2024-05-13 LAB
ALBUMIN SERPL-MCNC: 4.7 G/DL (ref 3.5–5.2)
ALBUMIN/GLOB SERPL: 1.6 G/DL
ALP SERPL-CCNC: 61 U/L (ref 39–117)
ALT SERPL W P-5'-P-CCNC: 31 U/L (ref 1–41)
ANION GAP SERPL CALCULATED.3IONS-SCNC: 11 MMOL/L (ref 5–15)
AST SERPL-CCNC: 29 U/L (ref 1–40)
BASOPHILS # BLD AUTO: 0.05 10*3/MM3 (ref 0–0.2)
BASOPHILS NFR BLD AUTO: 0.3 % (ref 0–1.5)
BILIRUB SERPL-MCNC: 0.6 MG/DL (ref 0–1.2)
BUN SERPL-MCNC: 25 MG/DL (ref 6–20)
BUN/CREAT SERPL: 22.7 (ref 7–25)
CALCIUM SPEC-SCNC: 9 MG/DL (ref 8.6–10.5)
CHLORIDE SERPL-SCNC: 104 MMOL/L (ref 98–107)
CO2 SERPL-SCNC: 24 MMOL/L (ref 22–29)
CREAT SERPL-MCNC: 1.1 MG/DL (ref 0.76–1.27)
DEPRECATED RDW RBC AUTO: 42.6 FL (ref 37–54)
EGFRCR SERPLBLD CKD-EPI 2021: 89.2 ML/MIN/1.73
EOSINOPHIL # BLD AUTO: 0.08 10*3/MM3 (ref 0–0.4)
EOSINOPHIL NFR BLD AUTO: 0.5 % (ref 0.3–6.2)
ERYTHROCYTE [DISTWIDTH] IN BLOOD BY AUTOMATED COUNT: 12.8 % (ref 12.3–15.4)
GLOBULIN UR ELPH-MCNC: 2.9 GM/DL
GLUCOSE SERPL-MCNC: 110 MG/DL (ref 65–99)
HCT VFR BLD AUTO: 42.4 % (ref 37.5–51)
HGB BLD-MCNC: 14.8 G/DL (ref 13–17.7)
IMM GRANULOCYTES # BLD AUTO: 0.07 10*3/MM3 (ref 0–0.05)
IMM GRANULOCYTES NFR BLD AUTO: 0.4 % (ref 0–0.5)
LYMPHOCYTES # BLD AUTO: 3.97 10*3/MM3 (ref 0.7–3.1)
LYMPHOCYTES NFR BLD AUTO: 24.8 % (ref 19.6–45.3)
MCH RBC QN AUTO: 32 PG (ref 26.6–33)
MCHC RBC AUTO-ENTMCNC: 34.9 G/DL (ref 31.5–35.7)
MCV RBC AUTO: 91.8 FL (ref 79–97)
MONOCYTES # BLD AUTO: 1.12 10*3/MM3 (ref 0.1–0.9)
MONOCYTES NFR BLD AUTO: 7 % (ref 5–12)
NEUTROPHILS NFR BLD AUTO: 10.73 10*3/MM3 (ref 1.7–7)
NEUTROPHILS NFR BLD AUTO: 67 % (ref 42.7–76)
NRBC BLD AUTO-RTO: 0 /100 WBC (ref 0–0.2)
PLATELET # BLD AUTO: 373 10*3/MM3 (ref 140–450)
PMV BLD AUTO: 10.7 FL (ref 6–12)
POTASSIUM SERPL-SCNC: 4.1 MMOL/L (ref 3.5–5.2)
PROT SERPL-MCNC: 7.6 G/DL (ref 6–8.5)
RBC # BLD AUTO: 4.62 10*6/MM3 (ref 4.14–5.8)
SODIUM SERPL-SCNC: 139 MMOL/L (ref 136–145)
WBC NRBC COR # BLD AUTO: 16.02 10*3/MM3 (ref 3.4–10.8)

## 2024-05-13 PROCEDURE — 80053 COMPREHEN METABOLIC PANEL: CPT

## 2024-05-13 PROCEDURE — 87522 HEPATITIS C REVRS TRNSCRPJ: CPT

## 2024-05-13 PROCEDURE — 85025 COMPLETE CBC W/AUTO DIFF WBC: CPT

## 2024-05-13 PROCEDURE — 36415 COLL VENOUS BLD VENIPUNCTURE: CPT

## 2024-05-15 LAB
HCV RNA SERPL NAA+PROBE-ACNC: NORMAL IU/ML
TEST INFORMATION: NORMAL

## 2024-07-26 ENCOUNTER — OFFICE VISIT (OUTPATIENT)
Dept: GASTROENTEROLOGY | Facility: HOSPITAL | Age: 39
End: 2024-07-26
Payer: COMMERCIAL

## 2024-07-26 VITALS
BODY MASS INDEX: 27.12 KG/M2 | DIASTOLIC BLOOD PRESSURE: 54 MMHG | WEIGHT: 200.2 LBS | HEART RATE: 73 BPM | SYSTOLIC BLOOD PRESSURE: 120 MMHG | HEIGHT: 72 IN

## 2024-07-26 DIAGNOSIS — B18.2 CHRONIC HEPATITIS C WITHOUT HEPATIC COMA: Primary | ICD-10-CM

## 2024-07-26 LAB
ALBUMIN SERPL-MCNC: 4.4 G/DL (ref 3.5–5.2)
ALBUMIN/GLOB SERPL: 1.5 G/DL
ALP SERPL-CCNC: 41 U/L (ref 39–117)
ALT SERPL W P-5'-P-CCNC: 42 U/L (ref 1–41)
ANION GAP SERPL CALCULATED.3IONS-SCNC: 6.9 MMOL/L (ref 5–15)
AST SERPL-CCNC: 36 U/L (ref 1–40)
BASOPHILS # BLD AUTO: 0.03 10*3/MM3 (ref 0–0.2)
BASOPHILS NFR BLD AUTO: 0.4 % (ref 0–1.5)
BILIRUB SERPL-MCNC: 0.7 MG/DL (ref 0–1.2)
BUN SERPL-MCNC: 14 MG/DL (ref 6–20)
BUN/CREAT SERPL: 10.9 (ref 7–25)
CALCIUM SPEC-SCNC: 9.2 MG/DL (ref 8.6–10.5)
CHLORIDE SERPL-SCNC: 105 MMOL/L (ref 98–107)
CO2 SERPL-SCNC: 25.1 MMOL/L (ref 22–29)
CREAT SERPL-MCNC: 1.29 MG/DL (ref 0.76–1.27)
DEPRECATED RDW RBC AUTO: 42.4 FL (ref 37–54)
EGFRCR SERPLBLD CKD-EPI 2021: 73.2 ML/MIN/1.73
EOSINOPHIL # BLD AUTO: 0.08 10*3/MM3 (ref 0–0.4)
EOSINOPHIL NFR BLD AUTO: 1.2 % (ref 0.3–6.2)
ERYTHROCYTE [DISTWIDTH] IN BLOOD BY AUTOMATED COUNT: 12.5 % (ref 12.3–15.4)
GLOBULIN UR ELPH-MCNC: 3 GM/DL
GLUCOSE SERPL-MCNC: 95 MG/DL (ref 65–99)
HCT VFR BLD AUTO: 44.3 % (ref 37.5–51)
HGB BLD-MCNC: 15.2 G/DL (ref 13–17.7)
IMM GRANULOCYTES # BLD AUTO: 0.01 10*3/MM3 (ref 0–0.05)
IMM GRANULOCYTES NFR BLD AUTO: 0.1 % (ref 0–0.5)
LYMPHOCYTES # BLD AUTO: 1.93 10*3/MM3 (ref 0.7–3.1)
LYMPHOCYTES NFR BLD AUTO: 27.8 % (ref 19.6–45.3)
MCH RBC QN AUTO: 31.9 PG (ref 26.6–33)
MCHC RBC AUTO-ENTMCNC: 34.3 G/DL (ref 31.5–35.7)
MCV RBC AUTO: 92.9 FL (ref 79–97)
MONOCYTES # BLD AUTO: 0.78 10*3/MM3 (ref 0.1–0.9)
MONOCYTES NFR BLD AUTO: 11.2 % (ref 5–12)
NEUTROPHILS NFR BLD AUTO: 4.12 10*3/MM3 (ref 1.7–7)
NEUTROPHILS NFR BLD AUTO: 59.3 % (ref 42.7–76)
NRBC BLD AUTO-RTO: 0 /100 WBC (ref 0–0.2)
PLATELET # BLD AUTO: 359 10*3/MM3 (ref 140–450)
PMV BLD AUTO: 10.9 FL (ref 6–12)
POTASSIUM SERPL-SCNC: 4.6 MMOL/L (ref 3.5–5.2)
PROT SERPL-MCNC: 7.4 G/DL (ref 6–8.5)
RBC # BLD AUTO: 4.77 10*6/MM3 (ref 4.14–5.8)
SODIUM SERPL-SCNC: 137 MMOL/L (ref 136–145)
WBC NRBC COR # BLD AUTO: 6.95 10*3/MM3 (ref 3.4–10.8)

## 2024-07-26 PROCEDURE — 80053 COMPREHEN METABOLIC PANEL: CPT | Performed by: NURSE PRACTITIONER

## 2024-07-26 PROCEDURE — 85025 COMPLETE CBC W/AUTO DIFF WBC: CPT | Performed by: NURSE PRACTITIONER

## 2024-07-26 PROCEDURE — 87522 HEPATITIS C REVRS TRNSCRPJ: CPT | Performed by: NURSE PRACTITIONER

## 2024-07-26 PROCEDURE — G0463 HOSPITAL OUTPT CLINIC VISIT: HCPCS | Performed by: NURSE PRACTITIONER

## 2024-07-26 NOTE — PROGRESS NOTES
Chief Complaint        Hepatitis C (6m f/u )    HPI     Mr. Fairchild is a 37 y/o male with chronic HCV, GT 1a with F0/F1 fibrosis determined by liver elastography.  He completed treatment with 12 weeks of Epclusa and presents for 6 month post treatment follow up.  He reports that he's doing well.  Denies any current complaints.      Subjective            Past Medical History     No Known Allergies  No current outpatient medications on file.  Past Medical History:   Diagnosis Date    GERD (gastroesophageal reflux disease)      History reviewed. No pertinent surgical history.  Social History     Socioeconomic History    Marital status: Single   Tobacco Use    Smoking status: Never    Smokeless tobacco: Current     Types: Snuff   Vaping Use    Vaping status: Some Days    Substances: Nicotine    Devices: Disposable   Substance and Sexual Activity    Alcohol use: Yes    Drug use: Not Currently    Sexual activity: Never       Objective     Objective     Vital Signs     Vitals:    07/26/24 1122   BP: 120/54   Pulse: 73     Body mass index is 27.15 kg/m².  Body surface area is 2.13 meters squared.      Physical Exam    Results       Result Review :     The following data was reviewed by: SUKHDEEP Knox on 07/26/2024:      CMP:  Lab Results   Component Value Date    BUN 25 (H) 05/13/2024    CREATININE 1.10 05/13/2024     05/13/2024    K 4.1 05/13/2024     05/13/2024    CALCIUM 9.0 05/13/2024    ALBUMIN 4.7 05/13/2024    BILITOT 0.6 05/13/2024    ALKPHOS 61 05/13/2024    AST 29 05/13/2024    ALT 31 05/13/2024     CBC w/ diff:   Lab Results   Component Value Date    WBC 16.02 (H) 05/13/2024    RBC 4.62 05/13/2024    HGB 14.8 05/13/2024    HCT 42.4 05/13/2024    MCV 91.8 05/13/2024    MCH 32.0 05/13/2024    MCHC 34.9 05/13/2024    RDW 12.8 05/13/2024     05/13/2024    NEUTRORELPCT 67.0 05/13/2024    AUTOIGPER 0.4 05/13/2024    LYMPHORELPCT 24.8 05/13/2024    MONORELPCT 7.0 05/13/2024    EOSRELPCT  0.5 05/13/2024    BASORELPCT 0.3 05/13/2024     HCV Genotype, HIV   Lab Results   Component Value Date    HCVGENOTYPE 1a 10/20/2023    [id number] Non-Reactive 10/20/2023     Acute Hepatitis Panel   Lab Results   Component Value Date    HEPBSAG Non-Reactive 10/20/2023    HEPAIGM Non-Reactive 10/20/2023    HEPBIGMCORE Non-Reactive 10/20/2023    HEPCVIRUSABY Reactive (A) 10/20/2023      Lab Results   Component Value Date    HEPATITISC HCV Not Detected 05/13/2024    [id number] 4.799 10/20/2023                Assessment and Plan          Assessment & Plan:    Diagnoses and all orders for this visit:    1. Chronic hepatitis C without hepatic coma (Primary)  -     Hepatitis C RNA, Quantitative, PCR (graph)  -     CBC & Differential  -     Comprehensive Metabolic Panel        Labs today to ensure SVR.    Patient Instructions: Avoid Alcohol, Avoid Illicit Drug Use, Importance of keeping appointments.  Patient Education Provided: Yes      Follow Up     Follow Up   Return if symptoms worsen or fail to improve.  Patient was given instructions and counseling regarding his condition or for health maintenance advice. Please see specific information pulled into the AVS if appropriate.     Cookie Peterson, APRN  07/26/2024

## 2024-07-29 ENCOUNTER — TELEPHONE (OUTPATIENT)
Dept: GASTROENTEROLOGY | Facility: CLINIC | Age: 39
End: 2024-07-29
Payer: COMMERCIAL

## 2024-07-29 LAB
HCV RNA SERPL NAA+PROBE-ACNC: NORMAL IU/ML
TEST INFORMATION: NORMAL

## 2024-07-29 NOTE — TELEPHONE ENCOUNTER
----- Message from Cookie Peterson sent at 7/29/2024 12:36 PM EDT -----  No HCV detected.  Patient is cured.  Please send letter to patient and PCP.

## 2024-07-29 NOTE — TELEPHONE ENCOUNTER
Left message with patient requesting a call back.   Letters sent to both patient and referring provider.

## 2024-07-30 ENCOUNTER — TELEPHONE (OUTPATIENT)
Dept: GASTROENTEROLOGY | Facility: CLINIC | Age: 39
End: 2024-07-30
Payer: COMMERCIAL